# Patient Record
Sex: FEMALE | Race: WHITE | NOT HISPANIC OR LATINO | URBAN - METROPOLITAN AREA
[De-identification: names, ages, dates, MRNs, and addresses within clinical notes are randomized per-mention and may not be internally consistent; named-entity substitution may affect disease eponyms.]

---

## 2021-11-04 ENCOUNTER — OFFICE (OUTPATIENT)
Dept: URBAN - METROPOLITAN AREA CLINIC 11 | Facility: CLINIC | Age: 86
End: 2021-11-04

## 2021-11-04 VITALS
WEIGHT: 160 LBS | HEART RATE: 75 BPM | DIASTOLIC BLOOD PRESSURE: 50 MMHG | OXYGEN SATURATION: 97 % | HEIGHT: 63 IN | SYSTOLIC BLOOD PRESSURE: 146 MMHG

## 2021-11-04 DIAGNOSIS — R13.10 DYSPHAGIA, UNSPECIFIED: ICD-10-CM

## 2021-11-04 DIAGNOSIS — K21.9 GASTRO-ESOPHAGEAL REFLUX DISEASE WITHOUT ESOPHAGITIS: ICD-10-CM

## 2021-11-04 DIAGNOSIS — R05.9 COUGH, UNSPECIFIED: ICD-10-CM

## 2021-11-04 PROCEDURE — 99204 OFFICE O/P NEW MOD 45 MIN: CPT | Performed by: NURSE PRACTITIONER

## 2021-11-04 RX ORDER — CETIRIZINE HYDROCHLORIDE 10 MG/1
10 TABLET, FILM COATED ORAL
Qty: 30 | Refills: 1 | Status: COMPLETED
Start: 2021-11-04 | End: 2022-08-29

## 2021-11-04 NOTE — SERVICEHPINOTES
Ms. Patiño is an 86 year old male that presents today for dysphagia. She notes that she has had dysphagia for about a year with progressive worsening over the past 2-3 months. She notes that she wakes in the morning with thick which mucus in her throat/mouth. She notes that she has episodes of coughing. When coughing she will cough up a thick yellow substance. She has seen ENT. She had a laryngoscopy with questionable decrease in right cord mobility. She had an MBS this week with negative findings of aspiration or penetration. She notes that when she swallows bread, she will feel like it wont go past the anterior cervical area. She notes that she has acid reflux once every two weeks. She denies presence of retrosternal burning or epigastric pain. She does take Tums 2-3 times per week because she feels as if she has acid in her mouth. She denies taking any recent antibiotics. She notes that she had a hiatal hernia on CT in 2011.
lorene paulson MD Note:   One year of a cough with this primarily occurring when she first awakens with thick, white sputum.  She may also have a cough if she awakens in the middle the night to urinate.  She has suprasternal notch level dysphagia to bread with this improving if she drinks liquids when eating bread.  No rejection of a food bolus.  No new medications per the onset of the above symptoms.  She has been on Vesicare for 3-4 months.  Significant trouble with a dry mouth.  No weight loss.  Laryngoscopy and modified barium swallow notedbr
lorene visited="true"

## 2021-11-04 NOTE — SERVICENOTES
Patient seen and examined independently by Jose Vasques M.D.  Patient discussed with KENNETH Wells and impression and plan developed by Jose Vasques and Amanda Baca.

## 2021-11-30 ENCOUNTER — OFFICE (OUTPATIENT)
Dept: URBAN - METROPOLITAN AREA CLINIC 11 | Facility: CLINIC | Age: 86
End: 2021-11-30

## 2021-11-30 VITALS
HEIGHT: 63 IN | OXYGEN SATURATION: 97 % | SYSTOLIC BLOOD PRESSURE: 136 MMHG | WEIGHT: 163 LBS | HEART RATE: 66 BPM | DIASTOLIC BLOOD PRESSURE: 50 MMHG

## 2021-11-30 DIAGNOSIS — R13.10 DYSPHAGIA, UNSPECIFIED: ICD-10-CM

## 2021-11-30 DIAGNOSIS — R05.1 ACUTE COUGH: ICD-10-CM

## 2021-11-30 PROCEDURE — 99214 OFFICE O/P EST MOD 30 MIN: CPT | Performed by: INTERNAL MEDICINE

## 2021-11-30 RX ORDER — FAMOTIDINE 40 MG/1
40 TABLET, FILM COATED ORAL
Qty: 30 | Refills: 11 | Status: COMPLETED
Start: 2021-11-30 | End: 2022-08-29

## 2022-08-29 ENCOUNTER — OFFICE (OUTPATIENT)
Dept: URBAN - METROPOLITAN AREA CLINIC 11 | Facility: CLINIC | Age: 87
End: 2022-08-29
Payer: COMMERCIAL

## 2022-08-29 VITALS
SYSTOLIC BLOOD PRESSURE: 120 MMHG | OXYGEN SATURATION: 99 % | HEIGHT: 63 IN | WEIGHT: 154 LBS | HEART RATE: 68 BPM | DIASTOLIC BLOOD PRESSURE: 78 MMHG

## 2022-08-29 DIAGNOSIS — R19.7 DIARRHEA, UNSPECIFIED: ICD-10-CM

## 2022-08-29 DIAGNOSIS — R10.11 RIGHT UPPER QUADRANT PAIN: ICD-10-CM

## 2022-08-29 DIAGNOSIS — R74.01 ELEVATION OF LEVELS OF LIVER TRANSAMINASE LEVELS: ICD-10-CM

## 2022-08-29 LAB
ACTIN (SMOOTH MUSCLE) ANTIBODY: 17 UNITS (ref 0–19)
HBSAG SCREEN: NEGATIVE
HCV ANTIBODY: HEP C VIRUS AB: <0.1 S/CO RATIO
HEPATIC FUNCTION PANEL (7): ALBUMIN: 4.2 G/DL (ref 3.6–4.6)
HEPATIC FUNCTION PANEL (7): ALKALINE PHOSPHATASE: 91 IU/L (ref 44–121)
HEPATIC FUNCTION PANEL (7): ALT (SGPT): 64 IU/L — HIGH (ref 0–32)
HEPATIC FUNCTION PANEL (7): AST (SGOT): 46 IU/L — HIGH (ref 0–40)
HEPATIC FUNCTION PANEL (7): BILIRUBIN, DIRECT: 0.13 MG/DL (ref 0–0.4)
HEPATIC FUNCTION PANEL (7): BILIRUBIN, TOTAL: 0.5 MG/DL (ref 0–1.2)
HEPATIC FUNCTION PANEL (7): PROTEIN, TOTAL: 6.9 G/DL (ref 6–8.5)

## 2022-08-29 PROCEDURE — 99214 OFFICE O/P EST MOD 30 MIN: CPT | Performed by: INTERNAL MEDICINE

## 2022-08-30 LAB — C DIFFICILE TOXINS A+B, EIA: NEGATIVE

## 2022-09-14 ENCOUNTER — OFFICE (OUTPATIENT)
Dept: URBAN - METROPOLITAN AREA CLINIC 19 | Facility: CLINIC | Age: 87
End: 2022-09-14
Payer: COMMERCIAL

## 2022-09-14 DIAGNOSIS — R10.11 RIGHT UPPER QUADRANT PAIN: ICD-10-CM

## 2022-09-14 DIAGNOSIS — R74.01 ELEVATION OF LEVELS OF LIVER TRANSAMINASE LEVELS: ICD-10-CM

## 2022-09-14 PROCEDURE — 76705 ECHO EXAM OF ABDOMEN: CPT | Mod: TC | Performed by: INTERNAL MEDICINE
